# Patient Record
(demographics unavailable — no encounter records)

---

## 2025-01-28 NOTE — HISTORY OF PRESENT ILLNESS
[FreeTextEntry1] : LMP 2 wk ago  31yo G0 here for wellness and PCC. Was on OCPs because periods were heavy, stopped OCPs 3 weeks ago. Prev with reg period x7d.   HCM - pap 1/2024 normal denies hx abnl paps

## 2025-01-28 NOTE — PLAN
[FreeTextEntry1] : Discussed starting PNV and checking PCC labs. Pt and partner interested in [facility] ([place]  92). Pap done, discussed ovulation timing and tracking cycles.  Patient screened for depression - no signs of clinical depression. PHQ-9 scores reviewed over the course of the visit 5-10minutes of face to face time. Follow up with changes in mood including other symptoms of anxiety.

## 2025-04-29 NOTE — HISTORY OF PRESENT ILLNESS
[Partner] : partner [Other Location: e.g. School (Enter Location, City,State)___] : at [unfilled], at the time of the visit. [Medical Office: ([facility])___] : at the medical office located in  [Telehealth (audio & video)] : This visit was provided via telehealth using real-time 2-way audio visual technology. [Verbal consent obtained from patient] : the patient, [unfilled]